# Patient Record
Sex: FEMALE | Race: OTHER | Employment: OTHER | URBAN - METROPOLITAN AREA
[De-identification: names, ages, dates, MRNs, and addresses within clinical notes are randomized per-mention and may not be internally consistent; named-entity substitution may affect disease eponyms.]

---

## 2017-04-20 ENCOUNTER — HOSPITAL ENCOUNTER (OUTPATIENT)
Dept: MAMMOGRAPHY | Age: 77
Discharge: HOME | End: 2017-04-20
Attending: INTERNAL MEDICINE
Payer: MEDICARE

## 2017-04-20 DIAGNOSIS — Z12.31 VISIT FOR SCREENING MAMMOGRAM: ICD-10-CM

## 2017-04-20 PROCEDURE — 77063 BREAST TOMOSYNTHESIS BI: CPT

## 2018-01-18 ENCOUNTER — HOSPITAL ENCOUNTER (EMERGENCY)
Age: 78
Discharge: HOME | End: 2018-01-18
Attending: EMERGENCY MEDICINE
Payer: MEDICARE

## 2018-01-18 ENCOUNTER — APPOINTMENT (OUTPATIENT)
Dept: GENERAL RADIOLOGY | Age: 78
End: 2018-01-18
Attending: NURSE PRACTITIONER
Payer: MEDICARE

## 2018-01-18 VITALS
BODY MASS INDEX: 25.97 KG/M2 | TEMPERATURE: 99 F | SYSTOLIC BLOOD PRESSURE: 148 MMHG | HEART RATE: 84 BPM | DIASTOLIC BLOOD PRESSURE: 63 MMHG | OXYGEN SATURATION: 98 % | RESPIRATION RATE: 17 BRPM | WEIGHT: 133 LBS

## 2018-01-18 DIAGNOSIS — K59.00 CONSTIPATION, UNSPECIFIED CONSTIPATION TYPE: Primary | ICD-10-CM

## 2018-01-18 PROCEDURE — 74019 RADEX ABDOMEN 2 VIEWS: CPT

## 2018-01-18 PROCEDURE — 99283 EMERGENCY DEPT VISIT LOW MDM: CPT

## 2018-01-18 PROCEDURE — 74011250637 HC RX REV CODE- 250/637: Performed by: NURSE PRACTITIONER

## 2018-01-18 RX ADMIN — SODIUM PHOSPHATE, DIBASIC AND SODIUM PHOSPHATE, MONOBASIC 118 ML: 7; 19 ENEMA RECTAL at 09:09

## 2018-01-18 NOTE — ED NOTES
Discharge conducted with use of  phone. Many questions answered. I have reviewed discharge instructions with the patient. The patient verbalized understanding.   Patient armband removed and shredded

## 2018-01-18 NOTE — DISCHARGE INSTRUCTIONS
Estreñimiento: Instrucciones de cuidado - [ Constipation: Care Instructions ]  Instrucciones de cuidado    Tener estreñimiento significa que usted tiene dificultades para eliminar las heces (evacuaciones del intestino). Las personas eliminan heces entre 3 veces al día y Evangelina Axe vez cada 3 días. Lo que es normal para usted puede ser [employer]. El estreñimiento puede ocurrir con dolor en el recto y cólicos. El dolor podría empeorar cuando trata de eliminar las heces. A veces hay pequeñas cantidades de mark khloe viva en el papel higiénico o en la superficie de las heces. [place] se debe a las venas dilatadas cerca del recto (hemorroides). Algunos cambios en rojas Earla [place] y estilo de bianca podrían ayudarle a evitar el estreñimiento continuo. Es posible que el médico además le recete medicamentos para ayudar a aflojar las heces. Algunos medicamentos pueden causar estreñimiento. [place] incluye los analgésicos (medicamentos para el dolor) y los antidepresivos. Infórmele a rojas médico sobre Tien Ivan que usted mari. Es posible que rojas médico quiera cambiar un medicamento para aliviar evan síntomas. La atención de seguimiento es jessica parte clave de rojas tratamiento y seguridad. Asegúrese de hacer y acudir a todas las citas, y llame a rojas médico si está teniendo problemas. También es jessica buena idea saber los resultados de los exámenes y mantener jessica lista de los medicamentos que mari. ¿Cómo puede cuidarse en el hogar? · Arin abundantes líquidos, los suficientes félix para que rojas orina sea de color [place] grant o transparente félix el agua. Si tiene [employer], del corazón o del hígado y tiene que Noemi's líquidos, hable con rojas médico antes de aumentar rojas consumo. · Incluya en rojas dieta diaria alimentos ricos en fibra. Estos incluyen frutas, verduras, frijoles (habichuelas) y granos integrales. · Armando por lo menos 30 minutos de ejercicio la mayoría de los días de la [place].  Caminar es [place] opción. Es posible que también quiera hacer otras actividades, félix correr, nadar, [employer], o jugar al tenis u otros deportes de equipo. · [place] un suplemento de Bruner, félix Citrucel o Metamucil, todos los GRASSE. Yareli y siga todas las indicaciones de la [place]. · Programe tiempo todos los días para evacuar el intestino. Angelo garcia podría ayudar. Tómese brower tiempo para evacuar el intestino. · Apoye los pies sobre un banco o taburete pequeño cuando se siente en el inodoro. [place] ayuda a flexionar las caderas y coloca la pelvis en posición de cuclillas. · Brower médico podría recomendarle un laxante de venta ericka para aliviar el estreñimiento. Fleta Whittington son Doyce Boyers de Magnesia (Milk of Magnesia) y [place]. Yareli y siga todas las instrucciones de la [place]. No use laxantes de [employer]. ¿Cuándo debe pedir ayuda? Llame a brower médico ahora mismo o busque atención médica inmediata si:  ? · Tiene dolor abdominal nuevo o peor. ? · Tiene náuseas o vómito nuevos o peores. ? · Tiene mark en las heces. ?Preste especial atención a los cambios en brower abhinav y asegúrese de comunicarse con brower médico si:  ? · Brower estreñimiento empeora. ? · No mejora félix se esperaba. ¿Dónde puede encontrar más información en inglés? Marciano Snow a [email or web address]-daquan.[email or web address] Escriba P343 en la búsqueda para aprender Judith Melchor de \"Estreñimiento: Instrucciones de cuidado - [ Constipation: Care Instructions ]. \"  Revisado: 20 Jackie Rand 2017  Versión del contenido: 11.4  © 3650-0964 Healthwise, Incorporated. Las instrucciones de cuidado fueron adaptadas bajo licencia por [employer] (which disclaims liability or warranty for this information). Si usted tiene [place] afección médica o sobre estas instrucciones, siempre pregunte a brower profesional de abhinav. Healthwise, Incorporated niega toda garantía o responsabilidad por brower uso de esta información.     MyChart Activation    Thank you for requesting access to [facility]. Please follow the instructions below to securely access and download your online medical record. NGI allows you to send messages to your doctor, view your test results, renew your prescriptions, schedule appointments, and more. How Do I Sign Up? 1. In your internet browser, go to [email or web address]  2. Click on the First Time User? Click Here link in the Sign In box. You will be redirect to the New Member Sign Up page. 3. Enter your NGI Access Code exactly as it appears below. You will not need to use this code after youve completed the sign-up process. If you do not sign up before the expiration date, you must request a new code. NGI Access Code: [email or web address]  Expires: 2018  8:05 AM (This is the date your NGI access code will )    4. Enter the last four digits of your Social Security Number (xxxx) and Date of Birth (mm/dd/yyyy) as indicated and click Submit. You will be taken to the next sign-up page. 5. Create a NGI ID. This will be your NGI login ID and cannot be changed, so think of one that is secure and easy to remember. 6. Create a NGI password. You can change your password at any time. 7. Enter your Password Reset Question and Answer. This can be used at a later time if you forget your password. 8. Enter your e-mail address. You will receive e-mail notification when new information is available in [address] 9. Click Sign Up. You can now view and download portions of your medical record. 10. Click the Download Summary menu link to download a portable copy of your medical information. Additional Information    If you have questions, please visit the Frequently Asked Questions section of the NGI website at [email or web address] [email or web address] Remember, NGI is NOT to be used for urgent needs. For medical emergencies, dial 911. Keep well hydrated.   Use the Miralax you have as needed for occasional constipation. Follow up with Dr Shaunna Thakur for further evaluation as needed.

## 2018-01-18 NOTE — ED PROVIDER NOTES
HPI Comments: Itz Lamas is a 65year old non English speaking female who presents to the ED with a c/o constipation for the past six days. States her symptoms began on Friday 01-12-18. She has had one bout of diarrhea since onset. She has tried Miralax and Senekot, but they have not helped to alleviate her symptoms. Patient is a 65 y.o. female presenting with constipation. The history is provided by the patient, the spouse and a relative. The history is limited by a language barrier. A  was used. Constipation    This is a new problem. Episode onset: Onset of symptoms was six days ago. Associated symptoms include abdominal pain and constipation. Pertinent negatives include no abdominal distention. Treatments tried: Senekot - Miralax. The treatment provided no relief. Past Medical History:   Diagnosis Date    HTN (hypertension)     Hyperlipidemia     Menopause     NSTEMI (non-ST elevated myocardial infarction) ([facility] [address]     01/2012  & 5/2013 ( In setting of possible stress induced CMP )    Takotsubo cardiomyopathy     05/2013    Varicose vein        Past Surgical History:   Procedure Laterality Date    HX TRABECULECTOMY           History reviewed. No pertinent family history. Social History     Social History    Marital status: SINGLE     Spouse name: N/A    Number of children: N/A    Years of education: N/A     Occupational History    Not on file. Social History Main Topics    Smoking status: Never Smoker    Smokeless tobacco: Never Used    Alcohol use No    Drug use: No    Sexual activity: Not on file     Other Topics Concern    Not on file     Social History Narrative         ALLERGIES: Review of patient's allergies indicates no known allergies. Review of Systems   Constitutional: Negative. HENT: Negative. Eyes: Negative. Respiratory: Negative. Cardiovascular: Negative. Gastrointestinal: Positive for abdominal pain and constipation. Negative for abdominal distention. Endocrine: Negative. Genitourinary: Negative. Musculoskeletal: Negative. Skin: Negative. Allergic/Immunologic: Negative. Neurological: Negative. Hematological: Negative. Psychiatric/Behavioral: Negative. Vitals:    01/18/18 0737   BP: 148/63   Pulse: 84   Resp: 17   Temp: 99 °F (37.2 °C)   SpO2: 98%   Weight: 60.3 kg (133 lb)            Physical Exam   Constitutional: She is oriented to person, place, and time. She appears well-developed and well-nourished. No distress. HENT:   Head: Normocephalic and atraumatic. Eyes: EOM are normal. Pupils are equal, round, and reactive to light. Neck: Normal range of motion. Neck supple. Cardiovascular: Normal rate, regular rhythm, normal heart sounds and intact distal pulses. Pulmonary/Chest: No respiratory distress. She has no wheezes. She has no rales. Abdominal: Soft. Bowel sounds are normal. There is no tenderness. There is no rebound. Genitourinary:   Genitourinary Comments: JUAN:  Normal sphincter tone. No external hemorrhoids or fissures. No rectal impaction noted. Musculoskeletal: Normal range of motion. Neurological: She is alert and oriented to person, place, and time. No cranial nerve deficit. Coordination normal.   Skin: Skin is warm and dry. Psychiatric: She has a normal mood and affect. Nursing note and vitals reviewed. MDM  Number of Diagnoses or Management Options  Constipation, unspecified constipation type:   Diagnosis management comments: PROGRESS NOTE:  Constipation noted on imaging of the abdomen. No obstruction noted.    Marino Vela, NP  9:02 AM           Amount and/or Complexity of Data Reviewed  Tests in the radiology section of CPT®: ordered and reviewed  Independent visualization of images, tracings, or specimens: yes (Abdomen XR  )    Risk of Complications, Morbidity, and/or Mortality  Presenting problems: low  Diagnostic procedures: low  Management options: low      ED Course       Procedures    Diagnosis:   1. Constipation, unspecified constipation type          Disposition:   Discharge to Home. Follow-up Information     Follow up With Details Comments Contact Info    BANDAR Lewis MD Call today to arrange a follow up evaluation. [address] [address]  [phone]            Patient's Medications   Start Taking    No medications on file   Continue Taking    ACETAMINOPHEN (TYLENOL) 325 MG TABLET    Take 650 mg by mouth every four (4) hours as needed for Pain. ASPIRIN 81 MG CHEWABLE TABLET    Take 81 mg by mouth daily. CARVEDILOL (COREG) 3.125 MG TABLET    Take 1 Tab by mouth two (2) times daily (with meals). LISINOPRIL (PRINIVIL, ZESTRIL) 20 MG TABLET    TAKE ONE TABLET BY MOUTH EVERY DAY    LORAZEPAM (ATIVAN) 0.5 MG TABLET    Take 0.5 mg by mouth daily as needed for Anxiety.     PRAVASTATIN (PRAVACHOL) 80 MG TABLET    TAKE ONE TABLET BY MOUTH NIGHTLY   These Medications have changed    No medications on file   Stop Taking    No medications on file

## 2018-01-18 NOTE — ED NOTES
[facility]  phone used to communicate with patient. Patient reports constipation since Saturday with rectal pain beginning Monday. Pt trying to treat at home with Sennokot and Miralax without relief. Pt reports rectal pain at an 8. Rectal exam reveals soft brown stool, no evidence of impaction, bleeding, or hemorrhoids.

## 2019-03-27 ENCOUNTER — HOSPITAL ENCOUNTER (OUTPATIENT)
Dept: MAMMOGRAPHY | Age: 79
Discharge: HOME | End: 2019-03-27
Attending: INTERNAL MEDICINE
Payer: MEDICARE

## 2019-03-27 DIAGNOSIS — Z12.31 VISIT FOR SCREENING MAMMOGRAM: ICD-10-CM

## 2019-03-27 PROCEDURE — 77063 BREAST TOMOSYNTHESIS BI: CPT
